# Patient Record
Sex: FEMALE | Race: WHITE | NOT HISPANIC OR LATINO | Employment: PART TIME | URBAN - METROPOLITAN AREA
[De-identification: names, ages, dates, MRNs, and addresses within clinical notes are randomized per-mention and may not be internally consistent; named-entity substitution may affect disease eponyms.]

---
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
## 2024-01-31 ENCOUNTER — OFFICE VISIT (OUTPATIENT)
Dept: FAMILY MEDICINE | Facility: CLINIC | Age: 19
End: 2024-01-31
Payer: COMMERCIAL

## 2024-01-31 VITALS
SYSTOLIC BLOOD PRESSURE: 118 MMHG | HEIGHT: 68 IN | OXYGEN SATURATION: 97 % | TEMPERATURE: 97.5 F | BODY MASS INDEX: 18.43 KG/M2 | HEART RATE: 87 BPM | WEIGHT: 121.6 LBS | RESPIRATION RATE: 12 BRPM | DIASTOLIC BLOOD PRESSURE: 76 MMHG

## 2024-01-31 DIAGNOSIS — F42.4 SKIN-PICKING DISORDER: Primary | ICD-10-CM

## 2024-01-31 PROCEDURE — 99213 OFFICE O/P EST LOW 20 MIN: CPT | Mod: GC | Performed by: STUDENT IN AN ORGANIZED HEALTH CARE EDUCATION/TRAINING PROGRAM

## 2024-01-31 ASSESSMENT — PATIENT HEALTH QUESTIONNAIRE - PHQ9: SUM OF ALL RESPONSES TO PHQ QUESTIONS 1-9: 1

## 2024-01-31 NOTE — PROGRESS NOTES
"  Assessment & Plan     Skin-picking disorder  Skin picking disorder, primarily of bilateral thumbs. Some improvement in compulsive behavior with sertraline 50mg, though still persistent. Will increase dose by 25mg today. Not interested in therapy at this time.   - sertraline (ZOLOFT) 50 MG tablet; Take 1.5 tablets (75 mg) by mouth daily for 60 days      Return in about 4 weeks (around 2/28/2024).    Subjective   Kaylee is a 18 year old, presenting for the following health issues:  Recheck Medication      1/31/2024     3:24 PM   Additional Questions   Roomed by renard   Accompanied by self     HPI       Lee like zoloft helped a lot for a few weeks after starting on 12/19, though increased picking in the past week. No new stressors or changes in the past week. Feels that mental health is overall better.     Objective    /76 (BP Location: Right arm, Patient Position: Sitting, Cuff Size: Adult Regular)   Pulse 87   Temp 97.5  F (36.4  C) (Oral)   Resp 12   Ht 1.727 m (5' 8\")   Wt 55.2 kg (121 lb 9.6 oz)   SpO2 97%   BMI 18.49 kg/m    Body mass index is 18.49 kg/m .  Physical Exam  Vitals reviewed.   Constitutional:       Appearance: Normal appearance.   HENT:      Head: Normocephalic.   Eyes:      Conjunctiva/sclera: Conjunctivae normal.   Cardiovascular:      Rate and Rhythm: Normal rate.   Pulmonary:      Effort: Pulmonary effort is normal.   Musculoskeletal:         General: No swelling.   Skin:     General: Skin is warm and dry.      Comments: Excoriations and skin peeling around nails on bilateral thumbs. No erythema/swelling.   Neurological:      Mental Status: She is alert.   Psychiatric:         Mood and Affect: Mood normal.         Behavior: Behavior normal.         Thought Content: Thought content normal.         Judgment: Judgment normal.                Signed Electronically by: Chiqui Maldonado MD    "

## 2024-01-31 NOTE — PROGRESS NOTES
Preceptor Attestation:  Patient seen and evaluated in person. I discussed the patient with the resident. I have verified the content of the note, which accurately reflects my assessment of the patient and the plan of care.   Supervising Physician:  Ashley Hollingsworth DO

## 2024-05-06 DIAGNOSIS — F42.4 SKIN-PICKING DISORDER: ICD-10-CM

## 2024-05-07 NOTE — TELEPHONE ENCOUNTER
"Request for medication refill:  sertraline (ZOLOFT) 50 MG tablet     Providers if patient needs an appointment and you are willing to give a one month supply please refill for one month and  send a letter/MyChart using \".SMILLIMITEDREFILL\" .smillimited and route chart to \"P [facility] \" (Giving one month refill in non controlled medications is strongly recommended before denial)    If refill has been denied, meaning absolutely no refills without visit, please complete the smart phrase \".smirxrefuse\" and route it to the \"P [facility] MED REFILLS\"  pool to inform the patient and the pharmacy.    Neela Lauren, [facility]      "

## 2024-05-27 ENCOUNTER — MYC REFILL (OUTPATIENT)
Dept: FAMILY MEDICINE | Facility: CLINIC | Age: 19
End: 2024-05-27
Payer: COMMERCIAL

## 2024-05-27 DIAGNOSIS — F42.4 SKIN-PICKING DISORDER: ICD-10-CM

## 2024-05-28 NOTE — TELEPHONE ENCOUNTER
"Request for medication refill:  sertraline (ZOLOFT) 50 MG tablet     Providers if patient needs an appointment and you are willing to give a one month supply please refill for one month and  send a letter/MyChart using \".SMILLIMITEDREFILL\" .smillimited and route chart to \"P [facility] \" (Giving one month refill in non controlled medications is strongly recommended before denial)    If refill has been denied, meaning absolutely no refills without visit, please complete the smart phrase \".smirxrefuse\" and route it to the \"P [facility] MED REFILLS\"  pool to inform the patient and the pharmacy.    Neela Lauren, [facility]      "

## 2024-06-19 ENCOUNTER — OFFICE VISIT (OUTPATIENT)
Dept: FAMILY MEDICINE | Facility: CLINIC | Age: 19
End: 2024-06-19
Payer: COMMERCIAL

## 2024-06-19 VITALS
HEIGHT: 67 IN | WEIGHT: 127.6 LBS | DIASTOLIC BLOOD PRESSURE: 58 MMHG | HEART RATE: 74 BPM | TEMPERATURE: 98.7 F | OXYGEN SATURATION: 99 % | RESPIRATION RATE: 12 BRPM | BODY MASS INDEX: 20.03 KG/M2 | SYSTOLIC BLOOD PRESSURE: 96 MMHG

## 2024-06-19 DIAGNOSIS — F42.4 SKIN-PICKING DISORDER: Primary | ICD-10-CM

## 2024-06-19 PROCEDURE — 99214 OFFICE O/P EST MOD 30 MIN: CPT | Mod: GC | Performed by: STUDENT IN AN ORGANIZED HEALTH CARE EDUCATION/TRAINING PROGRAM

## 2024-06-19 PROCEDURE — G2211 COMPLEX E/M VISIT ADD ON: HCPCS | Performed by: STUDENT IN AN ORGANIZED HEALTH CARE EDUCATION/TRAINING PROGRAM

## 2024-06-19 ASSESSMENT — ASTHMA QUESTIONNAIRES
QUESTION_3 LAST FOUR WEEKS HOW OFTEN DID YOUR ASTHMA SYMPTOMS (WHEEZING, COUGHING, SHORTNESS OF BREATH, CHEST TIGHTNESS OR PAIN) WAKE YOU UP AT NIGHT OR EARLIER THAN USUAL IN THE MORNING: NOT AT ALL
QUESTION_5 LAST FOUR WEEKS HOW WOULD YOU RATE YOUR ASTHMA CONTROL: COMPLETELY CONTROLLED
ACT_TOTALSCORE: 25
ACT_TOTALSCORE: 25
QUESTION_1 LAST FOUR WEEKS HOW MUCH OF THE TIME DID YOUR ASTHMA KEEP YOU FROM GETTING AS MUCH DONE AT WORK, SCHOOL OR AT HOME: NONE OF THE TIME
QUESTION_4 LAST FOUR WEEKS HOW OFTEN HAVE YOU USED YOUR RESCUE INHALER OR NEBULIZER MEDICATION (SUCH AS ALBUTEROL): NOT AT ALL
QUESTION_2 LAST FOUR WEEKS HOW OFTEN HAVE YOU HAD SHORTNESS OF BREATH: NOT AT ALL

## 2024-06-19 NOTE — PROGRESS NOTES
"  Assessment & Plan     Skin-picking disorder  Patient with long history of skin picking disorder primarily with excoriations on bilateral thumbs.  Previously prescribed Zoloft, the patient is currently endorsing nausea with Zoloft and limited improvement in her symptoms.  Has been provided with list of community therapists, though has not establish care with a therapist.  Will place referral to see a therapist at [facility] and facilitate bridging to the community.  Will also trial switching Zoloft to Prozac.  - Adult Mental Health  Referral; Future  - FLUoxetine (PROZAC) 20 MG capsule; Take 1 capsule (20 mg) by mouth daily    Anabelle Page is a 18 year old, presenting for the following health issues:  Recheck Medication (Changing medication )      6/19/2024     1:08 PM   Additional Questions   Roomed by renard   Accompanied by self         6/19/2024    Information    services provided? No        HPI     Continues to struggle with skin picking disorder.  Has intermittently discontinued using Zoloft, with last use approximately 5 days ago.  Endorses some nausea with taking Zoloft, worse with dose increase to 75 mg.  Is interested in therapy, though has not had a chance to call and establish care yet.      Objective    BP 96/58 (BP Location: Left arm, Patient Position: Sitting, Cuff Size: Adult Regular)   Pulse 74   Temp 98.7  F (37.1  C) (Oral)   Resp 12   Ht 1.702 m (5' 7\")   Wt 57.9 kg (127 lb 9.6 oz)   LMP 06/17/2024 (Approximate)   SpO2 99%   BMI 19.98 kg/m    Body mass index is 19.98 kg/m .  Physical Exam  Vitals reviewed.   Constitutional:       Appearance: Normal appearance.   Eyes:      Conjunctiva/sclera: Conjunctivae normal.   Cardiovascular:      Rate and Rhythm: Normal rate.   Pulmonary:      Effort: Pulmonary effort is normal.   Musculoskeletal:         General: No swelling.   Skin:     General: Skin is warm and dry.   Neurological:      Mental Status: " She is alert.   Psychiatric:         Mood and Affect: Mood normal.         Behavior: Behavior normal.         Thought Content: Thought content normal.         Judgment: Judgment normal.                Signed Electronically by: Chiqui Maldonado MD

## 2024-06-23 ENCOUNTER — HEALTH MAINTENANCE LETTER (OUTPATIENT)
Age: 19
End: 2024-06-23

## 2024-09-17 DIAGNOSIS — F42.4 SKIN-PICKING DISORDER: ICD-10-CM

## 2024-09-18 NOTE — TELEPHONE ENCOUNTER
"Request for medication refill:  FLUoxetine (PROZAC) 20 MG capsule     Providers if patient needs an appointment and you are willing to give a one month supply please refill for one month and  send a letter/MyChart using \".SMILLIMITEDREFILL\" .smillimited and route chart to \"P [facility] \" (Giving one month refill in non controlled medications is strongly recommended before denial)    If refill has been denied, meaning absolutely no refills without visit, please complete the smart phrase \".smirxrefuse\" and route it to the \"P [facility] MED REFILLS\"  pool to inform the patient and the pharmacy.    Neela Lauren [facility]      "

## 2025-03-03 DIAGNOSIS — F42.4 SKIN-PICKING DISORDER: ICD-10-CM

## 2025-03-04 NOTE — TELEPHONE ENCOUNTER
"Request for medication refill:    Medication Name: FLUoxetine (PROZAC) 20 MG capsule     Providers if patient needs an appointment and you are willing to give a one month supply please refill for one month and  send a MyChart using \".SMILLIMITEDREFILL\" .Or route chart to \"P [facility] \" . To call patient and inform of limited refill and providers request to make an appointment. (Giving one month refill in non controlled medications is strongly recommended before denial)    If refill has been denied, meaning absolutely no refills without visit, please complete the smart phrase \".SMIRXREFUSE\" and route it to the \"P [facility] MED REFILLS\"  pool to inform the patient and the pharmacy.    Osiris Mcdonald RN      "

## 2025-07-12 ENCOUNTER — HEALTH MAINTENANCE LETTER (OUTPATIENT)
Age: 20
End: 2025-07-12

## 2025-07-16 DIAGNOSIS — F42.4 SKIN-PICKING DISORDER: ICD-10-CM

## 2025-07-16 NOTE — TELEPHONE ENCOUNTER
"Request for medication refill:    Medication Name: FLUoxetine (PROZAC) 20 MG capsule     Providers if patient needs an appointment and you are willing to give a one month supply please refill for one month and  send a MyChart using \".SMILLIMITEDREFILL\" .Or route chart to \"P [facility] \" . And use the phrase \" SMIRXFOLLOWUP\"To call patient and inform of limited refill and providers request to make an appointment. (Giving one month refill in non controlled medications is strongly recommended before denial)    If refill has been denied, meaning absolutely no refills without visit, please complete the smart phrase \".SMIRXREFUSE\" and route it to the \"P [facility] MED REFILLS\"  pool to inform the patient and the pharmacy.    Mitchell Arriaga, CMA     "

## 2025-08-23 DIAGNOSIS — F42.4 SKIN-PICKING DISORDER: ICD-10-CM
